# Patient Record
Sex: MALE | Race: BLACK OR AFRICAN AMERICAN | Employment: UNEMPLOYED | URBAN - METROPOLITAN AREA
[De-identification: names, ages, dates, MRNs, and addresses within clinical notes are randomized per-mention and may not be internally consistent; named-entity substitution may affect disease eponyms.]

---

## 2020-10-02 ENCOUNTER — HOSPITAL ENCOUNTER (INPATIENT)
Age: 40
LOS: 2 days | Discharge: HOME | DRG: 432 | End: 2020-10-04
Attending: EMERGENCY MEDICINE | Admitting: INTERNAL MEDICINE

## 2020-10-02 ENCOUNTER — APPOINTMENT (OUTPATIENT)
Dept: GENERAL RADIOLOGY | Age: 40
DRG: 432 | End: 2020-10-02

## 2020-10-02 ENCOUNTER — APPOINTMENT (OUTPATIENT)
Dept: CT IMAGING | Age: 40
DRG: 432 | End: 2020-10-02

## 2020-10-02 PROBLEM — F10.921 ALCOHOL INTOXICATION WITH DELIRIUM (HCC): Status: ACTIVE | Noted: 2020-10-02

## 2020-10-02 PROBLEM — K70.10 ALCOHOLIC HEPATITIS WITHOUT ASCITES: Status: ACTIVE | Noted: 2020-10-02

## 2020-10-02 PROBLEM — R74.01 TRANSAMINITIS: Status: ACTIVE | Noted: 2020-10-02

## 2020-10-02 PROBLEM — K85.20 ALCOHOL-INDUCED ACUTE PANCREATITIS WITHOUT INFECTION OR NECROSIS: Status: ACTIVE | Noted: 2020-10-02

## 2020-10-02 LAB
ABO/RH: NORMAL
ABSOLUTE EOS #: 0.05 K/UL (ref 0–0.44)
ABSOLUTE IMMATURE GRANULOCYTE: 0.07 K/UL (ref 0–0.3)
ABSOLUTE LYMPH #: 2.77 K/UL (ref 1.1–3.7)
ABSOLUTE MONO #: 1.03 K/UL (ref 0.1–1.2)
ACETAMINOPHEN LEVEL: <5 UG/ML (ref 10–30)
ALBUMIN SERPL-MCNC: 3.5 G/DL (ref 3.5–5.2)
ALBUMIN/GLOBULIN RATIO: 0.8 (ref 1–2.5)
ALP BLD-CCNC: 257 U/L (ref 40–129)
ALT SERPL-CCNC: 96 U/L (ref 5–41)
AMMONIA: 66 UMOL/L (ref 16–60)
AMPHETAMINE SCREEN URINE: NEGATIVE
ANION GAP SERPL CALCULATED.3IONS-SCNC: 15 MMOL/L (ref 9–17)
ANTIBODY SCREEN: NEGATIVE
ARM BAND NUMBER: NORMAL
AST SERPL-CCNC: 238 U/L
BARBITURATE SCREEN URINE: NEGATIVE
BASOPHILS # BLD: 2 % (ref 0–2)
BASOPHILS ABSOLUTE: 0.16 K/UL (ref 0–0.2)
BENZODIAZEPINE SCREEN, URINE: NEGATIVE
BILIRUB SERPL-MCNC: 2.53 MG/DL (ref 0.3–1.2)
BILIRUBIN DIRECT: 1.48 MG/DL
BILIRUBIN URINE: NEGATIVE
BUN BLDV-MCNC: 3 MG/DL (ref 6–20)
BUN/CREAT BLD: ABNORMAL (ref 9–20)
BUPRENORPHINE URINE: ABNORMAL
CALCIUM SERPL-MCNC: 8.9 MG/DL (ref 8.6–10.4)
CANNABINOID SCREEN URINE: POSITIVE
CHLORIDE BLD-SCNC: 101 MMOL/L (ref 98–107)
CO2: 22 MMOL/L (ref 20–31)
COCAINE METABOLITE, URINE: NEGATIVE
COLOR: YELLOW
COMMENT UA: NORMAL
CREAT SERPL-MCNC: 0.42 MG/DL (ref 0.7–1.2)
DIFFERENTIAL TYPE: ABNORMAL
EOSINOPHILS RELATIVE PERCENT: 1 % (ref 1–4)
ETHANOL PERCENT: 0.34 %
ETHANOL: 342 MG/DL
EXPIRATION DATE: NORMAL
FOLATE: 9.4 NG/ML
GFR AFRICAN AMERICAN: >60 ML/MIN
GFR NON-AFRICAN AMERICAN: >60 ML/MIN
GFR SERPL CREATININE-BSD FRML MDRD: ABNORMAL ML/MIN/{1.73_M2}
GFR SERPL CREATININE-BSD FRML MDRD: ABNORMAL ML/MIN/{1.73_M2}
GLUCOSE BLD-MCNC: 134 MG/DL (ref 70–99)
GLUCOSE URINE: NEGATIVE
HCT VFR BLD CALC: 35.8 % (ref 40.7–50.3)
HEMOGLOBIN: 11.8 G/DL (ref 13–17)
IMMATURE GRANULOCYTES: 1 %
INR BLD: 1.1
KETONES, URINE: NEGATIVE
LACTIC ACID, WHOLE BLOOD: 2.2 MMOL/L (ref 0.7–2.1)
LACTIC ACID: ABNORMAL MMOL/L
LEUKOCYTE ESTERASE, URINE: NEGATIVE
LIPASE: 64 U/L (ref 13–60)
LYMPHOCYTES # BLD: 29 % (ref 24–43)
MCH RBC QN AUTO: 35.4 PG (ref 25.2–33.5)
MCHC RBC AUTO-ENTMCNC: 33 G/DL (ref 28.4–34.8)
MCV RBC AUTO: 107.5 FL (ref 82.6–102.9)
MDMA URINE: ABNORMAL
METHADONE SCREEN, URINE: NEGATIVE
METHAMPHETAMINE, URINE: ABNORMAL
MONOCYTES # BLD: 11 % (ref 3–12)
NITRITE, URINE: NEGATIVE
NRBC AUTOMATED: 0 PER 100 WBC
OPIATES, URINE: NEGATIVE
OXYCODONE SCREEN URINE: NEGATIVE
PARTIAL THROMBOPLASTIN TIME: 27.7 SEC (ref 20.5–30.5)
PDW BLD-RTO: 14.6 % (ref 11.8–14.4)
PH UA: 5.5 (ref 5–8)
PHENCYCLIDINE, URINE: NEGATIVE
PHOSPHORUS: 2.9 MG/DL (ref 2.5–4.5)
PLATELET # BLD: 206 K/UL (ref 138–453)
PLATELET ESTIMATE: ABNORMAL
PMV BLD AUTO: 10.1 FL (ref 8.1–13.5)
POTASSIUM SERPL-SCNC: 3.7 MMOL/L (ref 3.7–5.3)
PROPOXYPHENE, URINE: ABNORMAL
PROTEIN UA: NEGATIVE
PROTHROMBIN TIME: 11.5 SEC (ref 9–12)
RBC # BLD: 3.33 M/UL (ref 4.21–5.77)
RBC # BLD: ABNORMAL 10*6/UL
SALICYLATE LEVEL: <1 MG/DL (ref 3–10)
SEG NEUTROPHILS: 56 % (ref 36–65)
SEGMENTED NEUTROPHILS ABSOLUTE COUNT: 5.43 K/UL (ref 1.5–8.1)
SODIUM BLD-SCNC: 138 MMOL/L (ref 135–144)
SPECIFIC GRAVITY UA: 1.01 (ref 1–1.03)
TEST INFORMATION: ABNORMAL
TOTAL PROTEIN: 7.8 G/DL (ref 6.4–8.3)
TOXIC TRICYCLIC SC,BLOOD: NEGATIVE
TRICYCLIC ANTIDEPRESSANTS, UR: ABNORMAL
TURBIDITY: CLEAR
URINE HGB: NEGATIVE
UROBILINOGEN, URINE: NORMAL
VITAMIN B-12: 903 PG/ML (ref 232–1245)
WBC # BLD: 9.5 K/UL (ref 3.5–11.3)
WBC # BLD: ABNORMAL 10*3/UL

## 2020-10-02 PROCEDURE — 81003 URINALYSIS AUTO W/O SCOPE: CPT

## 2020-10-02 PROCEDURE — 80053 COMPREHEN METABOLIC PANEL: CPT

## 2020-10-02 PROCEDURE — 82140 ASSAY OF AMMONIA: CPT

## 2020-10-02 PROCEDURE — 2580000003 HC RX 258: Performed by: STUDENT IN AN ORGANIZED HEALTH CARE EDUCATION/TRAINING PROGRAM

## 2020-10-02 PROCEDURE — 83690 ASSAY OF LIPASE: CPT

## 2020-10-02 PROCEDURE — 84100 ASSAY OF PHOSPHORUS: CPT

## 2020-10-02 PROCEDURE — 99285 EMERGENCY DEPT VISIT HI MDM: CPT

## 2020-10-02 PROCEDURE — 82248 BILIRUBIN DIRECT: CPT

## 2020-10-02 PROCEDURE — 85025 COMPLETE CBC W/AUTO DIFF WBC: CPT

## 2020-10-02 PROCEDURE — 99223 1ST HOSP IP/OBS HIGH 75: CPT | Performed by: INTERNAL MEDICINE

## 2020-10-02 PROCEDURE — 86900 BLOOD TYPING SEROLOGIC ABO: CPT

## 2020-10-02 PROCEDURE — 2500000003 HC RX 250 WO HCPCS: Performed by: STUDENT IN AN ORGANIZED HEALTH CARE EDUCATION/TRAINING PROGRAM

## 2020-10-02 PROCEDURE — 71045 X-RAY EXAM CHEST 1 VIEW: CPT

## 2020-10-02 PROCEDURE — G0480 DRUG TEST DEF 1-7 CLASSES: HCPCS

## 2020-10-02 PROCEDURE — 80307 DRUG TEST PRSMV CHEM ANLYZR: CPT

## 2020-10-02 PROCEDURE — 74177 CT ABD & PELVIS W/CONTRAST: CPT

## 2020-10-02 PROCEDURE — 6360000004 HC RX CONTRAST MEDICATION: Performed by: STUDENT IN AN ORGANIZED HEALTH CARE EDUCATION/TRAINING PROGRAM

## 2020-10-02 PROCEDURE — 86850 RBC ANTIBODY SCREEN: CPT

## 2020-10-02 PROCEDURE — 83605 ASSAY OF LACTIC ACID: CPT

## 2020-10-02 PROCEDURE — 85610 PROTHROMBIN TIME: CPT

## 2020-10-02 PROCEDURE — 82746 ASSAY OF FOLIC ACID SERUM: CPT

## 2020-10-02 PROCEDURE — 6360000002 HC RX W HCPCS: Performed by: STUDENT IN AN ORGANIZED HEALTH CARE EDUCATION/TRAINING PROGRAM

## 2020-10-02 PROCEDURE — 36415 COLL VENOUS BLD VENIPUNCTURE: CPT

## 2020-10-02 PROCEDURE — 85730 THROMBOPLASTIN TIME PARTIAL: CPT

## 2020-10-02 PROCEDURE — 2060000000 HC ICU INTERMEDIATE R&B

## 2020-10-02 PROCEDURE — 6360000002 HC RX W HCPCS

## 2020-10-02 PROCEDURE — 86901 BLOOD TYPING SEROLOGIC RH(D): CPT

## 2020-10-02 PROCEDURE — 82607 VITAMIN B-12: CPT

## 2020-10-02 RX ORDER — LORAZEPAM 2 MG/ML
2 INJECTION INTRAMUSCULAR ONCE
Status: DISCONTINUED | OUTPATIENT
Start: 2020-10-02 | End: 2020-10-02

## 2020-10-02 RX ORDER — ACETAMINOPHEN 325 MG/1
650 TABLET ORAL EVERY 4 HOURS PRN
Status: CANCELLED | OUTPATIENT
Start: 2020-10-02

## 2020-10-02 RX ORDER — LORAZEPAM 2 MG/ML
INJECTION INTRAMUSCULAR
Status: DISCONTINUED
Start: 2020-10-02 | End: 2020-10-02

## 2020-10-02 RX ORDER — PROMETHAZINE HYDROCHLORIDE 12.5 MG/1
12.5 TABLET ORAL EVERY 6 HOURS PRN
Status: DISCONTINUED | OUTPATIENT
Start: 2020-10-02 | End: 2020-10-04 | Stop reason: HOSPADM

## 2020-10-02 RX ORDER — THIAMINE HYDROCHLORIDE 100 MG/ML
100 INJECTION, SOLUTION INTRAMUSCULAR; INTRAVENOUS DAILY
Status: DISCONTINUED | OUTPATIENT
Start: 2020-10-02 | End: 2020-10-02

## 2020-10-02 RX ORDER — SODIUM CHLORIDE 9 MG/ML
10 INJECTION INTRAVENOUS DAILY
Status: DISCONTINUED | OUTPATIENT
Start: 2020-10-02 | End: 2020-10-04 | Stop reason: HOSPADM

## 2020-10-02 RX ORDER — LORAZEPAM 2 MG/ML
2 INJECTION INTRAMUSCULAR
Status: DISCONTINUED | OUTPATIENT
Start: 2020-10-02 | End: 2020-10-04 | Stop reason: HOSPADM

## 2020-10-02 RX ORDER — PANTOPRAZOLE SODIUM 40 MG/10ML
40 INJECTION, POWDER, LYOPHILIZED, FOR SOLUTION INTRAVENOUS DAILY
Status: DISCONTINUED | OUTPATIENT
Start: 2020-10-02 | End: 2020-10-04 | Stop reason: HOSPADM

## 2020-10-02 RX ORDER — SODIUM CHLORIDE 0.9 % (FLUSH) 0.9 %
10 SYRINGE (ML) INJECTION EVERY 12 HOURS SCHEDULED
Status: DISCONTINUED | OUTPATIENT
Start: 2020-10-02 | End: 2020-10-04 | Stop reason: HOSPADM

## 2020-10-02 RX ORDER — LORAZEPAM 2 MG/ML
4 INJECTION INTRAMUSCULAR
Status: DISCONTINUED | OUTPATIENT
Start: 2020-10-02 | End: 2020-10-04 | Stop reason: HOSPADM

## 2020-10-02 RX ORDER — LORAZEPAM 0.5 MG/1
1 TABLET ORAL
Status: DISCONTINUED | OUTPATIENT
Start: 2020-10-02 | End: 2020-10-04 | Stop reason: HOSPADM

## 2020-10-02 RX ORDER — LORAZEPAM 2 MG/1
4 TABLET ORAL
Status: DISCONTINUED | OUTPATIENT
Start: 2020-10-02 | End: 2020-10-04 | Stop reason: HOSPADM

## 2020-10-02 RX ORDER — SODIUM CHLORIDE, SODIUM LACTATE, POTASSIUM CHLORIDE, AND CALCIUM CHLORIDE .6; .31; .03; .02 G/100ML; G/100ML; G/100ML; G/100ML
1000 INJECTION, SOLUTION INTRAVENOUS ONCE
Status: COMPLETED | OUTPATIENT
Start: 2020-10-02 | End: 2020-10-02

## 2020-10-02 RX ORDER — SODIUM CHLORIDE 0.9 % (FLUSH) 0.9 %
10 SYRINGE (ML) INJECTION EVERY 12 HOURS SCHEDULED
Status: CANCELLED | OUTPATIENT
Start: 2020-10-02

## 2020-10-02 RX ORDER — FOLIC ACID 5 MG/ML
1 INJECTION, SOLUTION INTRAMUSCULAR; INTRAVENOUS; SUBCUTANEOUS DAILY
Status: DISCONTINUED | OUTPATIENT
Start: 2020-10-02 | End: 2020-10-02

## 2020-10-02 RX ORDER — POLYETHYLENE GLYCOL 3350 17 G/17G
17 POWDER, FOR SOLUTION ORAL DAILY PRN
Status: DISCONTINUED | OUTPATIENT
Start: 2020-10-02 | End: 2020-10-04 | Stop reason: HOSPADM

## 2020-10-02 RX ORDER — HALOPERIDOL 5 MG/ML
INJECTION INTRAMUSCULAR
Status: DISCONTINUED
Start: 2020-10-02 | End: 2020-10-02

## 2020-10-02 RX ORDER — ONDANSETRON 2 MG/ML
4 INJECTION INTRAMUSCULAR; INTRAVENOUS EVERY 6 HOURS PRN
Status: DISCONTINUED | OUTPATIENT
Start: 2020-10-02 | End: 2020-10-04 | Stop reason: HOSPADM

## 2020-10-02 RX ORDER — SODIUM CHLORIDE 0.9 % (FLUSH) 0.9 %
10 SYRINGE (ML) INJECTION PRN
Status: DISCONTINUED | OUTPATIENT
Start: 2020-10-02 | End: 2020-10-04 | Stop reason: HOSPADM

## 2020-10-02 RX ORDER — LORAZEPAM 2 MG/ML
1 INJECTION INTRAMUSCULAR
Status: DISCONTINUED | OUTPATIENT
Start: 2020-10-02 | End: 2020-10-04 | Stop reason: HOSPADM

## 2020-10-02 RX ORDER — LORAZEPAM 2 MG/ML
3 INJECTION INTRAMUSCULAR
Status: DISCONTINUED | OUTPATIENT
Start: 2020-10-02 | End: 2020-10-04 | Stop reason: HOSPADM

## 2020-10-02 RX ORDER — SODIUM CHLORIDE, SODIUM LACTATE, POTASSIUM CHLORIDE, AND CALCIUM CHLORIDE .6; .31; .03; .02 G/100ML; G/100ML; G/100ML; G/100ML
1000 INJECTION, SOLUTION INTRAVENOUS ONCE
Status: DISCONTINUED | OUTPATIENT
Start: 2020-10-02 | End: 2020-10-02

## 2020-10-02 RX ORDER — HALOPERIDOL 5 MG/ML
10 INJECTION INTRAMUSCULAR ONCE
Status: DISCONTINUED | OUTPATIENT
Start: 2020-10-02 | End: 2020-10-02

## 2020-10-02 RX ORDER — HALOPERIDOL 5 MG/ML
INJECTION INTRAMUSCULAR
Status: COMPLETED
Start: 2020-10-02 | End: 2020-10-02

## 2020-10-02 RX ORDER — LORAZEPAM 0.5 MG/1
2 TABLET ORAL
Status: DISCONTINUED | OUTPATIENT
Start: 2020-10-02 | End: 2020-10-04 | Stop reason: HOSPADM

## 2020-10-02 RX ORDER — SODIUM CHLORIDE 9 MG/ML
INJECTION, SOLUTION INTRAVENOUS CONTINUOUS
Status: DISCONTINUED | OUTPATIENT
Start: 2020-10-02 | End: 2020-10-03

## 2020-10-02 RX ORDER — SODIUM CHLORIDE 0.9 % (FLUSH) 0.9 %
10 SYRINGE (ML) INJECTION PRN
Status: CANCELLED | OUTPATIENT
Start: 2020-10-02

## 2020-10-02 RX ORDER — M-VIT,TX,IRON,MINS/CALC/FOLIC 27MG-0.4MG
1 TABLET ORAL DAILY
Status: DISCONTINUED | OUTPATIENT
Start: 2020-10-02 | End: 2020-10-04 | Stop reason: HOSPADM

## 2020-10-02 RX ORDER — SODIUM CHLORIDE, SODIUM LACTATE, POTASSIUM CHLORIDE, AND CALCIUM CHLORIDE .6; .31; .03; .02 G/100ML; G/100ML; G/100ML; G/100ML
1000 INJECTION, SOLUTION INTRAVENOUS ONCE
Status: COMPLETED | OUTPATIENT
Start: 2020-10-02 | End: 2020-10-03

## 2020-10-02 RX ADMIN — SODIUM CHLORIDE, POTASSIUM CHLORIDE, SODIUM LACTATE AND CALCIUM CHLORIDE 1000 ML: 600; 310; 30; 20 INJECTION, SOLUTION INTRAVENOUS at 22:20

## 2020-10-02 RX ADMIN — HALOPERIDOL 10 MG: 5 INJECTION INTRAMUSCULAR at 11:59

## 2020-10-02 RX ADMIN — IOPAMIDOL 75 ML: 755 INJECTION, SOLUTION INTRAVENOUS at 17:39

## 2020-10-02 RX ADMIN — Medication 10 ML: at 22:20

## 2020-10-02 RX ADMIN — HALOPERIDOL LACTATE 10 MG: 5 INJECTION, SOLUTION INTRAMUSCULAR at 11:59

## 2020-10-02 RX ADMIN — SODIUM CHLORIDE, POTASSIUM CHLORIDE, SODIUM LACTATE AND CALCIUM CHLORIDE 1000 ML: 600; 310; 30; 20 INJECTION, SOLUTION INTRAVENOUS at 12:30

## 2020-10-02 RX ADMIN — FOLIC ACID: 5 INJECTION, SOLUTION INTRAMUSCULAR; INTRAVENOUS; SUBCUTANEOUS at 15:00

## 2020-10-02 SDOH — HEALTH STABILITY: MENTAL HEALTH: HOW OFTEN DO YOU HAVE A DRINK CONTAINING ALCOHOL?: 4 OR MORE TIMES A WEEK

## 2020-10-02 SDOH — HEALTH STABILITY: MENTAL HEALTH: HOW MANY STANDARD DRINKS CONTAINING ALCOHOL DO YOU HAVE ON A TYPICAL DAY?: 3 OR 4

## 2020-10-02 ASSESSMENT — ENCOUNTER SYMPTOMS: BLOOD IN STOOL: 1

## 2020-10-02 NOTE — ED PROVIDER NOTES
Hemoglobin 11.8 (*)     Hematocrit 35.8 (*)     .5 (*)     MCH 35.4 (*)     RDW 14.6 (*)     Immature Granulocytes 1 (*)     All other components within normal limits   COMPREHENSIVE METABOLIC PANEL - Abnormal; Notable for the following components:    Glucose 134 (*)     BUN 3 (*)     CREATININE 0.42 (*)     Alkaline Phosphatase 257 (*)     ALT 96 (*)      (*)     Total Bilirubin 2.53 (*)     Albumin/Globulin Ratio 0.8 (*)     All other components within normal limits   TOX SCR, BLD, ED - Abnormal; Notable for the following components:    Acetaminophen Level <5 (*)     Ethanol 342 (*)     Ethanol percent 7.048 (*)     Salicylate Lvl <1 (*)     All other components within normal limits   LIPASE - Abnormal; Notable for the following components:    Lipase 64 (*)     All other components within normal limits   AMMONIA - Abnormal; Notable for the following components:    Ammonia 66 (*)     All other components within normal limits   BILIRUBIN, DIRECT - Abnormal; Notable for the following components:    Bilirubin, Direct 1.48 (*)     All other components within normal limits   PROTIME-INR   APTT   URINE RT REFLEX TO CULTURE   LACTIC ACID, PLASMA   PHOSPHORUS   URINE DRUG SCREEN   TYPE AND SCREEN       Ct Abdomen Pelvis W Iv Contrast Additional Contrast? None    Result Date: 10/2/2020  EXAMINATION: CT OF THE ABDOMEN AND PELVIS WITH CONTRAST 10/2/2020 3:34 pm TECHNIQUE: CT of the abdomen and pelvis was performed with the administration of intravenous contrast. Multiplanar reformatted images are provided for review. Dose modulation, iterative reconstruction, and/or weight based adjustment of the mA/kV was utilized to reduce the radiation dose to as low as reasonably achievable. COMPARISON: None.  HISTORY: ORDERING SYSTEM PROVIDED HISTORY: Alcohol intoxication, possible pancreatitis, ascitic fluid, possible hernia TECHNOLOGIST PROVIDED HISTORY: Alcohol intoxication, possible pancreatitis, ascitic fluid, possible hernia Reason for Exam: ETOH, possible pancreatitis, ascitic fluid, possible hernia Acuity: Acute Type of Exam: Initial FINDINGS: Lower Chest: Basilar atelectasis is noted. No effusion, consolidation or cardiomegaly is noted. Organs: Hepatomegaly and hepatic steatosis are noted without focal mass lesion. Spleen is unremarkable. Pericholecystic fluid is noted. No distinct gallstones are noted in the gallbladder. Acalculous cholecystitis is not excluded. Stranding around the tail of the pancreas is noted with some haziness around the head of the pancreas. Findings favor pancreatitis. No pseudocyst is visualized. Adrenals and kidneys demonstrate no acute abnormality although a left renal cyst of 1.4 cm is present. GI/Bowel: No free fluid, free air, bowel obstruction or bowel wall thickening is noted. Pelvis: Bladder is unremarkable. Shotty inguinal lymph nodes are present. Prostate is enlarged. No abnormal fluid collections, pelvic or inguinal adenopathy is noted. No appendicitis is noted. Peritoneum/Retroperitoneum: No aortic aneurysm, retroperitoneal or mesenteric adenopathy is noted. Bones/Soft Tissues: No acute osseous or soft tissue abnormality is noted. 1.  Haziness around the pancreas consistent with pancreatitis. 2.  Fluid around the gallbladder. Findings may be secondary to pancreatitis with acalculous cholecystitis not excluded. 3.  Hepatomegaly with fatty infiltration of the liver. Xr Chest Portable    Result Date: 10/2/2020  EXAMINATION: ONE XRAY VIEW OF THE CHEST 10/2/2020 2:53 pm COMPARISON: None. HISTORY: ORDERING SYSTEM PROVIDED HISTORY: alcohol intoxication TECHNOLOGIST PROVIDED HISTORY: alcohol intoxication FINDINGS: AP portable view of the chest time stamped at 1446 hours demonstrates overlying cardiac monitoring electrodes. Heart size is normal.  No vascular congestion, focal consolidation, effusion, or pneumothorax is noted.   Osseous and mediastinal structures are

## 2020-10-02 NOTE — ED NOTES
ED Violent/Self-Destructive Restraints Restraint Monitoring Q15 Minutes - Psychological Status: Subdued  Physical Comfort:  (Resting, no distress noted. ) Circulation: No signs of injury  Continuous Observation: Yes   Restraint Type - Leather/Alternative B Wrist (V): CONTINUED  Leather/Alternative B Ankle (V): CONTINUED           Kassie Hathaway RN  10/02/20 5615

## 2020-10-02 NOTE — ED PROVIDER NOTES
Faculty Sign-Out Attestation  Handoff taken on the following patient from prior Attending Physician: Nadia Ruiz    I was available and discussed any additional care issues that arose and coordinated the management plans with the resident(s) caring for the patient during my duty period. Any areas of disagreement with residents documentation of care or procedures are noted on the chart. I was personally present for the key portions of any/all procedures during my duty period. I have documented in the chart those procedures where I was not present during the key portions. Intoxicated but several lab abnormalities, to be admitted. CT ABDOMEN PELVIS W IV CONTRAST Additional Contrast? None   Preliminary Result   1. Haziness around the pancreas consistent with pancreatitis. 2.  Fluid around the gallbladder. Findings may be secondary to pancreatitis   with acalculous cholecystitis not excluded. 3.  Hepatomegaly with fatty infiltration of the liver. XR CHEST PORTABLE   Final Result   No acute cardiopulmonary process.                Shannon Limon MD  Attending Physician        Shannon Limon MD  10/02/20 2739

## 2020-10-02 NOTE — ED NOTES
ED Violent/Self-Destructive Restraints Restraint Monitoring Q15 Minutes - Psychological Status: Subdued  Physical Comfort:  (Resting, no distress noted. ) Circulation: No signs of injury  Continuous Observation: Yes   Restraint Type - Leather/Alternative B Wrist (V): CONTINUED  Leather/Alternative B Ankle (V): CONTINUED           Tennie SUZAN Collazo  10/02/20 6192

## 2020-10-02 NOTE — ED NOTES
Pt. Resting on stretcher, eyes closed, RR even and non-labored, no distress noted.    Will continue to monitor         Radha Arreguin RN  10/02/20 0310
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
## 2020-10-02 NOTE — ED NOTES
ED Violent/Self-Destructive Restraints Restraint Monitoring Q15 Minutes - Psychological Status: Subdued  Physical Comfort:  (Resting, no distress noted. ) Circulation: No signs of injury  Continuous Observation: Yes   Restraint Type - Leather/Alternative B Wrist (V): CONTINUED  Leather/Alternative B Ankle (V): CONTINUED           Kelby Hollis RN  10/02/20 6588
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
## 2020-10-02 NOTE — ED NOTES
Pt pushed door open and escaped the [facility]. Security called and walked pt back to the [facility]. Pt dinner tray delivered. Pt now sitting down, eating. [facility]guard at bedside. Safe environment maintained. Will continue to monitor.       Andria Snyder RN  10/02/20 6193

## 2020-10-02 NOTE — ED NOTES
ED Violent/Self-Destructive Restraints Restraint Monitoring Q15 Minutes - Psychological Status: Subdued  Physical Comfort:  (Resting, no distress noted. ) Circulation: No signs of injury  Continuous Observation: Yes   Restraint Type - Leather/Alternative B Wrist (V): CONTINUED  Leather/Alternative B Ankle (V): CONTINUED           Shakira Kamara RN  10/02/20 4245

## 2020-10-03 LAB
ABSOLUTE EOS #: 0.06 K/UL (ref 0–0.44)
ABSOLUTE IMMATURE GRANULOCYTE: 0.03 K/UL (ref 0–0.3)
ABSOLUTE LYMPH #: 1.62 K/UL (ref 1.1–3.7)
ABSOLUTE MONO #: 0.48 K/UL (ref 0.1–1.2)
ALBUMIN SERPL-MCNC: 2.5 G/DL (ref 3.5–5.2)
ALBUMIN/GLOBULIN RATIO: 0.8 (ref 1–2.5)
ALP BLD-CCNC: 168 U/L (ref 40–129)
ALT SERPL-CCNC: 66 U/L (ref 5–41)
ANION GAP SERPL CALCULATED.3IONS-SCNC: 6 MMOL/L (ref 9–17)
AST SERPL-CCNC: 166 U/L
BASOPHILS # BLD: 1 % (ref 0–2)
BASOPHILS ABSOLUTE: 0.07 K/UL (ref 0–0.2)
BILIRUB SERPL-MCNC: 1.93 MG/DL (ref 0.3–1.2)
BUN BLDV-MCNC: 4 MG/DL (ref 6–20)
BUN/CREAT BLD: ABNORMAL (ref 9–20)
CALCIUM SERPL-MCNC: 8.3 MG/DL (ref 8.6–10.4)
CHLORIDE BLD-SCNC: 108 MMOL/L (ref 98–107)
CO2: 27 MMOL/L (ref 20–31)
CREAT SERPL-MCNC: 0.42 MG/DL (ref 0.7–1.2)
DIFFERENTIAL TYPE: ABNORMAL
EOSINOPHILS RELATIVE PERCENT: 1 % (ref 1–4)
GFR AFRICAN AMERICAN: >60 ML/MIN
GFR NON-AFRICAN AMERICAN: >60 ML/MIN
GFR SERPL CREATININE-BSD FRML MDRD: ABNORMAL ML/MIN/{1.73_M2}
GFR SERPL CREATININE-BSD FRML MDRD: ABNORMAL ML/MIN/{1.73_M2}
GLUCOSE BLD-MCNC: 84 MG/DL (ref 70–99)
HCT VFR BLD CALC: 30.2 % (ref 40.7–50.3)
HEMOGLOBIN: 9.6 G/DL (ref 13–17)
IMMATURE GRANULOCYTES: 1 %
LIPASE: 36 U/L (ref 13–60)
LYMPHOCYTES # BLD: 26 % (ref 24–43)
MCH RBC QN AUTO: 34.5 PG (ref 25.2–33.5)
MCHC RBC AUTO-ENTMCNC: 31.8 G/DL (ref 28.4–34.8)
MCV RBC AUTO: 108.6 FL (ref 82.6–102.9)
MONOCYTES # BLD: 8 % (ref 3–12)
NRBC AUTOMATED: 0 PER 100 WBC
PDW BLD-RTO: 14.6 % (ref 11.8–14.4)
PLATELET # BLD: 160 K/UL (ref 138–453)
PLATELET ESTIMATE: ABNORMAL
PMV BLD AUTO: 10.1 FL (ref 8.1–13.5)
POTASSIUM SERPL-SCNC: 3.7 MMOL/L (ref 3.7–5.3)
RBC # BLD: 2.78 M/UL (ref 4.21–5.77)
RBC # BLD: ABNORMAL 10*6/UL
SEG NEUTROPHILS: 64 % (ref 36–65)
SEGMENTED NEUTROPHILS ABSOLUTE COUNT: 4.02 K/UL (ref 1.5–8.1)
SODIUM BLD-SCNC: 141 MMOL/L (ref 135–144)
TOTAL PROTEIN: 5.6 G/DL (ref 6.4–8.3)
WBC # BLD: 6.3 K/UL (ref 3.5–11.3)
WBC # BLD: ABNORMAL 10*3/UL

## 2020-10-03 PROCEDURE — C9113 INJ PANTOPRAZOLE SODIUM, VIA: HCPCS | Performed by: STUDENT IN AN ORGANIZED HEALTH CARE EDUCATION/TRAINING PROGRAM

## 2020-10-03 PROCEDURE — 6360000002 HC RX W HCPCS: Performed by: STUDENT IN AN ORGANIZED HEALTH CARE EDUCATION/TRAINING PROGRAM

## 2020-10-03 PROCEDURE — 36415 COLL VENOUS BLD VENIPUNCTURE: CPT

## 2020-10-03 PROCEDURE — 85025 COMPLETE CBC W/AUTO DIFF WBC: CPT

## 2020-10-03 PROCEDURE — 2580000003 HC RX 258: Performed by: STUDENT IN AN ORGANIZED HEALTH CARE EDUCATION/TRAINING PROGRAM

## 2020-10-03 PROCEDURE — 99232 SBSQ HOSP IP/OBS MODERATE 35: CPT | Performed by: INTERNAL MEDICINE

## 2020-10-03 PROCEDURE — 83690 ASSAY OF LIPASE: CPT

## 2020-10-03 PROCEDURE — 6370000000 HC RX 637 (ALT 250 FOR IP): Performed by: STUDENT IN AN ORGANIZED HEALTH CARE EDUCATION/TRAINING PROGRAM

## 2020-10-03 PROCEDURE — 2060000000 HC ICU INTERMEDIATE R&B

## 2020-10-03 PROCEDURE — 80053 COMPREHEN METABOLIC PANEL: CPT

## 2020-10-03 PROCEDURE — 2500000003 HC RX 250 WO HCPCS: Performed by: STUDENT IN AN ORGANIZED HEALTH CARE EDUCATION/TRAINING PROGRAM

## 2020-10-03 RX ORDER — FOLIC ACID 1 MG/1
1 TABLET ORAL DAILY
Status: DISCONTINUED | OUTPATIENT
Start: 2020-10-03 | End: 2020-10-04 | Stop reason: HOSPADM

## 2020-10-03 RX ORDER — THIAMINE MONONITRATE (VIT B1) 100 MG
100 TABLET ORAL DAILY
Status: DISCONTINUED | OUTPATIENT
Start: 2020-10-03 | End: 2020-10-04 | Stop reason: HOSPADM

## 2020-10-03 RX ORDER — SODIUM CHLORIDE, SODIUM LACTATE, POTASSIUM CHLORIDE, AND CALCIUM CHLORIDE .6; .31; .03; .02 G/100ML; G/100ML; G/100ML; G/100ML
1000 INJECTION, SOLUTION INTRAVENOUS ONCE
Status: COMPLETED | OUTPATIENT
Start: 2020-10-03 | End: 2020-10-03

## 2020-10-03 RX ORDER — 0.9 % SODIUM CHLORIDE 0.9 %
1000 INTRAVENOUS SOLUTION INTRAVENOUS ONCE
Status: COMPLETED | OUTPATIENT
Start: 2020-10-03 | End: 2020-10-03

## 2020-10-03 RX ADMIN — PANTOPRAZOLE SODIUM 40 MG: 40 INJECTION, POWDER, FOR SOLUTION INTRAVENOUS at 11:20

## 2020-10-03 RX ADMIN — Medication 10 ML: at 11:21

## 2020-10-03 RX ADMIN — SODIUM CHLORIDE 10 ML: 9 INJECTION, SOLUTION INTRAMUSCULAR; INTRAVENOUS; SUBCUTANEOUS at 11:20

## 2020-10-03 RX ADMIN — MULTIPLE VITAMINS W/ MINERALS TAB 1 TABLET: TAB at 11:20

## 2020-10-03 RX ADMIN — FOLIC ACID: 5 INJECTION, SOLUTION INTRAMUSCULAR; INTRAVENOUS; SUBCUTANEOUS at 11:14

## 2020-10-03 RX ADMIN — SODIUM CHLORIDE 1000 ML: 9 INJECTION, SOLUTION INTRAVENOUS at 00:22

## 2020-10-03 RX ADMIN — SODIUM CHLORIDE, POTASSIUM CHLORIDE, SODIUM LACTATE AND CALCIUM CHLORIDE 1000 ML: 600; 310; 30; 20 INJECTION, SOLUTION INTRAVENOUS at 09:28

## 2020-10-03 ASSESSMENT — ENCOUNTER SYMPTOMS: TACHYPNEA: 1

## 2020-10-03 NOTE — FLOWSHEET NOTE
Assessment:  Patient was reluctant to meet with . Patient did not want to talk. Intervention;  was ministry of presence. Outcome:  Patient wanted to end visit. Plan:  Chaplains will remain available for spiritual and emotional support as needed.      10/03/20 1704   Encounter Summary   Services provided to: Patient   Referral/Consult From: [facility]   Continue Visiting   (10/3/2020)   Complexity of Encounter Moderate   Length of Encounter 15 minutes   Spiritual Assessment Completed Yes   Routine   Type Initial   Assessment Approachable;Passive   Intervention Active listening;Sustaining presence/ Ministry of presence   Outcome Expressed gratitude

## 2020-10-03 NOTE — ED NOTES
Pt resting on cot. Safeguard at bedside. Safe environment maintained. Will continue to monitor.       Saqib Owusu RN  10/02/20 4043

## 2020-10-03 NOTE — PROGRESS NOTES
[facility]  Internal Medicine Teaching Residency Program  Inpatient Daily Progress Note  ______________________________________________________________________________    Patient: Bill Rodgers  YOB: 1980   IJY:[id number]    Acct: [de-identified]     Room: [facility]7789-64  Admit date: 10/2/2020  Today's date: 10/03/20  Number of days in the hospital: 1    SUBJECTIVE   Admitting Diagnosis: Alcohol-induced acute pancreatitis without infection or necrosis  CC: Alcohol intoxication. Pt examined at bedside. Chart & results reviewed. No acute overnight events. Vitals stable. Patient remains drowsy although arousable. No complaint of abdominal pain. Will start general diet, discontinue fluids, give PO thiamine, folic acid, multivitamins, monitor for delirium tremens, alcoholic hallucinosis as it has been 24 hours to last drink. ROS:  Constitutional:  negative for chills, fevers, sweats  Respiratory:  negative for cough, dyspnea on exertion, hemoptysis, shortness of breath, wheezing  Cardiovascular:  negative for chest pain, chest pressure/discomfort, lower extremity edema, palpitations  Gastrointestinal:  negative for abdominal pain, constipation, diarrhea, nausea, vomiting  Neurological:  negative for dizziness, headache  BRIEF HISTORY     The patient is a pleasant 37 y.o. male presents to the ER department intoxicated, brought by his father. According to the father, the patient is a daily drinker consuming 10-15 12 beers per day since age of 10. He recently moved to [facility] and has been having rectal incontinence, urinary incontinence, blood in stools, groin swelling.     Last drink was this morning. Upon arrival in the ER, patient was agitated and combative, requiring 2 doses of 5 mg Haldol IV and 2 mg Ativan IV. CT abdomen pelvis with IV contrast showed stranding around the tail and haziness around the head of the pancreas, likely pancreatitis. Pericholecystic fluid around the gallbladder (but no gallstones) likely secondary to acalculous cholecystitis. There is hepatomegaly with fatty infiltration of the liver and enlarged prostate. Chest x-ray showed no acute cardiopulmonary process.     On exam, patient was sedated due to Haldol and Ativan. Physical exam showed distended abdomen but no fluid thrill, with subdiaphragmatic abdominal protrusion.     In the ER: Vitals stable, afebrile, SPO2 95 on room air. AST> ALT: 238> 96                   Bilirubin 2.53, direct bilirubin 1.48,                    Lipase 64>>                  Ammonia 66                   Serum ethanol 0.342                  Urine drug screen positive for cannabinoid. UA negative for hemoglobin, ketones, bilirubin, nitrites, leukocyte                        esterases.     ER management: Haldol 5 mg x 2 doses. CIWA protocol started. 0.9% NS infusion at 100 mL/h                                Folic acid 1 mg, thiamine 100 mg, adult multivitamin with                                    vitamin K 10 mL in D5    OBJECTIVE     Vital Signs:  /72   Pulse 75   Temp 98.9 °F (37.2 °C) (Oral)   Resp 17   Ht 5' 11\" (1.803 m)   Wt 146 lb (66.2 kg)   SpO2 98%   BMI 20.36 kg/m²     Temp (24hrs), Av.5 °F (36.9 °C), Min:97.5 °F (36.4 °C), Max:99.9 °F (37.7 °C)    In: 1779   Out: 300 [Urine:300]    Physical Exam:  Constitutional: This is a well developed, well nourished, 18.5-24.9 - Normal 37y.o. year old male who is alert, oriented, cooperative and in no apparent distress. Head:normocephalic and atraumatic. EENT:  PERRLA. No conjunctival injections. Septum was midline, mucosa was without erythema, exudates or cobblestoning. No thrush was noted. Neck: Supple without thyromegaly. No elevated JVP. Trachea was midline.   Respiratory: Chest was symmetrical without dullness to percussion. Breath sounds bilaterally were clear to auscultation. There were no wheezes, rhonchi or rales. There is no intercostal retraction or use of accessory muscles. No egophony noted. Cardiovascular: Regular without murmur, clicks, gallops or rubs. Abdomen: Slightly rounded and soft without organomegaly. No rebound, rigidity or guarding was appreciated. Lymphatic: No lymphadenopathy. Musculoskeletal: Normal curvature of the spine. No gross muscle weakness. Extremities:  No lower extremity edema, ulcerations, tenderness, varicosities or erythema. Muscle size, tone and strength are normal.  No involuntary movements are noted. Skin:  Warm and dry. Good color, turgor and pigmentation. No lesions or scars.   No cyanosis or clubbing  Neurological/Psychiatric: The patient's general behavior, level of consciousness, thought content and emotional status is normal.        Medications:  Scheduled Medications:    folic acid  1 mg Oral Daily    thiamine  100 mg Oral Daily    sodium chloride flush  10 mL Intravenous 2 times per day    pantoprazole  40 mg Intravenous Daily    And    sodium chloride (PF)  10 mL Intravenous Daily    therapeutic multivitamin-minerals  1 tablet Oral Daily     Continuous Infusions:   PRN Medicationssodium chloride flush, 10 mL, PRN  polyethylene glycol, 17 g, Daily PRN  promethazine, 12.5 mg, Q6H PRN    Or  ondansetron, 4 mg, Q6H PRN  LORazepam, 1 mg, Q1H PRN    Or  LORazepam, 1 mg, Q1H PRN    Or  LORazepam, 2 mg, Q1H PRN    Or  LORazepam, 2 mg, Q1H PRN    Or  LORazepam, 3 mg, Q1H PRN    Or  LORazepam, 3 mg, Q1H PRN    Or  LORazepam, 4 mg, Q1H PRN    Or  LORazepam, 4 mg, Q1H PRN        Diagnostic Labs:  CBC:   Recent Labs     10/02/20  1227 10/03/20  0523   WBC 9.5 6.3   RBC 3.33* 2.78*   HGB 11.8* 9.6*   HCT 35.8* 30.2*   .5* 108.6*   RDW 14.6* 14.6*    160     BMP:   Recent Labs     10/02/20  1227 10/02/20  2301 10/03/20  0523     --  141   K 3.7  -- 3.7     --  108*   CO2 22  --  27   PHOS  --  2.9  --    BUN 3*  --  4*   CREATININE 0.42*  --  0.42*     BNP: No results for input(s): BNP in the last 72 hours. PT/INR:   Recent Labs     10/02/20  1227   PROTIME 11.5   INR 1.1     APTT:   Recent Labs     10/02/20  1227   APTT 27.7     CARDIAC ENZYMES: No results for input(s): CKMB, CKMBINDEX, TROPONINI in the last 72 hours. Invalid input(s): CKTOTAL;3  FASTING LIPID PANEL:No results found for: CHOL, HDL, TRIG  LIVER PROFILE:   Recent Labs     10/02/20  1227 10/03/20  0523   * 166*   ALT 96* 66*   BILIDIR 1.48*  --    BILITOT 2.53* 1.93*   ALKPHOS 257* 168*      MICROBIOLOGY: No results found for: CULTURE    Imaging:    Ct Abdomen Pelvis W Iv Contrast Additional Contrast? None    Result Date: 10/2/2020  1. Haziness around the pancreas consistent with pancreatitis. 2.  Fluid around the gallbladder. Findings may be secondary to pancreatitis with acalculous cholecystitis not excluded. 3.  Hepatomegaly with fatty infiltration of the liver. Xr Chest Portable    Result Date: 10/2/2020  No acute cardiopulmonary process. ASSESSMENT & PLAN     ASSESSMENT / PLAN:     Active Problems:  Alcoholic hepatitis without ascites  -Monitor LFTs daily. -LFTs improving: ALT 96>>66, >>166, bilirubin 2.53>>1.93  -Madrey discriminant factor score for alcoholic hepatitis 4.0   -CT abdomen showed hepatomegaly, with fatty infiltration of liver but no ascites fluid present.  -Discontinue Tylenol due to increased LFTs.     Alcohol induced acute pancreatitis without infection or necrosis  -Lipase decreased from 64 to 36. -CT abdomen pelvis showed stranding around the tail and haziness around the head of the pancreas.  -No gallstones seen but pericholecystic fluid noted around the gallbladder.  -Diet general started today.  -Zofran 4 mg IV for nausea. -Lactic acid blood 2.2, secondary to alcohol induced metabolic acidosis.   -All fluids discontinued.       Alcohol withdrawal secondary to intoxication:  -Patient placed on CIWA protocol.  -Folate, B12 wnl.  -Multivitamin, thiamine 750 mg, folic acid 1 mg replaced.  -will monitor for delirium tremens, alcoholic hallucinosis as it has been 24 hours since last drink.           DVT ppx: None  GI ppx: Protonix IV 40 mg daily.     PT/OT/SW: Ordered  Discharge Planning: To be covered by case management. Portia Cheung MD  Internal Medicine Resident, PGY-1  bessie Estrada; [place], [place]  10/3/2020, 12:07 PM  Attending Physician Statement  I have discussed the care of Violet Rodriguez and I have examined the patient myselft and taken ros and hpi , including pertinent history and exam findings,  with the resident. I have reviewed the key elements of all parts of the encounter with the resident. I agree with the assessment, plan and orders as documented by the resident.       Electronically signed by Gerhardt Lovings, MD

## 2020-10-03 NOTE — CARE COORDINATION
Met with pt after receiving a social work consult for [employer] abuse\". Pt was alert and oriented but did not want to discuss his drinking. When asked how much he is drinking he stated \"I don't know, I don't keep track\". Attempted to discuss if he felt he needed help with his drinking. He stated \"no, I can take care of it myself\". Asked pt to call for SW if she changes his mind.

## 2020-10-03 NOTE — ED NOTES
Security unlocked pt belongings. Medic transporting pt to floor via wheelchair.       Kay Owens RN  10/02/20 0833

## 2020-10-03 NOTE — PROGRESS NOTES
enzymes. · We will start patient on Protonix 40 mg IV. · We will give patient 1 L of Ringer lactate bolus followed by normal saline infusion at 100. · 1 L of banana bag given. Will give patient IV multivitamins. · We will check lactic acid one time. Lipase check tomorrow. Will check vitamin B12 and folate. We will continue to trend LFTs.     La Dan MD  PGY-3, Department of Internal Medicine  [place], [place]  10/2/2020 10:54 PM

## 2020-10-03 NOTE — H&P
management: Haldol 5 mg x 2 doses. CIWA protocol started. 0.9% NS infusion at 100 mL/h                                Folic acid 1 mg, thiamine 100 mg, adult multivitamin with                                    vitamin K 10 mL in D5    Review of Systems:  ROS could not be completed as patient is sedated. PAST MEDICAL HISTORY     No past medical history on file. PAST SURGICAL HISTORY     No past surgical history on file. ALLERGIES     Patient has no allergy information on record. MEDICATIONS PRIOR TO ADMISSION     Prior to Admission medications    Not on File       SOCIAL HISTORY     Tobacco: Unknown  Alcohol: Unknown  Illicits: Cannabinoid  Occupation: Unknown    FAMILY HISTORY     No family history on file. PHYSICAL EXAM     Vitals: /86   Pulse 84   Temp 97.5 °F (36.4 °C) (Oral)   Resp 17   Ht 5' 11\" (1.803 m)   Wt 146 lb (66.2 kg)   SpO2 96%   BMI 20.36 kg/m²   Tmax: Temp (24hrs), Av.5 °F (36.4 °C), Min:97.5 °F (36.4 °C), Max:97.5 °F (36.4 °C)    Last Body weight:   Wt Readings from Last 3 Encounters:   10/02/20 146 lb (66.2 kg)     Body Mass Index : Body mass index is 20.36 kg/m². PHYSICAL EXAMINATION:  Constitutional: This is a well developed, well nourished, 18.5-24.9 - Normal 37y.o. year old male who is sedated, sleeping but in no apparent distress. Head:normocephalic and atraumatic. EENT:  PERRLA. No conjunctival injections. Septum was midline, mucosa was without erythema, exudates or cobblestoning. No thrush was noted. Neck: Supple without thyromegaly. No elevated JVP. Trachea was midline. Respiratory: Chest was symmetrical without dullness to percussion. Breath sounds bilaterally were clear to auscultation. There were no wheezes, rhonchi or rales. There is no intercostal retraction or use of accessory muscles. No egophony noted. Cardiovascular: Regular without murmur, clicks, gallops or rubs. Abdomen: Distended, firm to palpation. Lymphatic: No lymphadenopathy. Musculoskeletal: Normal curvature of the spine. No gross muscle weakness. Extremities:  No lower extremity edema, ulcerations, tenderness, varicosities or erythema. Muscle size, tone and strength are normal.  No involuntary movements are noted. Skin:  Warm and dry. Good color, turgor and pigmentation. No lesions or scars. No cyanosis or clubbing  Neurological/Psychiatric: Agitated, combative.   Given Haldol 5 mg 2 doses and Ativan 2 mg    INVESTIGATIONS     Laboratory Testing:     Recent Results (from the past 24 hour(s))   TYPE AND SCREEN    Collection Time: 10/02/20 12:15 PM   Result Value Ref Range    Expiration Date 10/05/2020,2359     Arm Band Number BE [id number]     ABO/Rh O POSITIVE     Antibody Screen NEGATIVE    CBC WITH AUTO DIFFERENTIAL    Collection Time: 10/02/20 12:27 PM   Result Value Ref Range    WBC 9.5 3.5 - 11.3 k/uL    RBC 3.33 (L) 4.21 - 5.77 m/uL    Hemoglobin 11.8 (L) 13.0 - 17.0 g/dL    Hematocrit 35.8 (L) 40.7 - 50.3 %    .5 (H) 82.6 - 102.9 fL    MCH 35.4 (H) 25.2 - 33.5 pg    MCHC 33.0 28.4 - 34.8 g/dL    RDW 14.6 (H) 11.8 - 14.4 %    Platelets 799 234 - 593 k/uL    MPV 10.1 8.1 - 13.5 fL    NRBC Automated 0.0 0.0 per 100 WBC    Differential Type NOT REPORTED     Seg Neutrophils 56 36 - 65 %    Lymphocytes 29 24 - 43 %    Monocytes 11 3 - 12 %    Eosinophils % 1 1 - 4 %    Basophils 2 0 - 2 %    Immature Granulocytes 1 (H) 0 %    Segs Absolute 5.43 1.50 - 8.10 k/uL    Absolute Lymph # 2.77 1.10 - 3.70 k/uL    Absolute Mono # 1.03 0.10 - 1.20 k/uL    Absolute Eos # 0.05 0.00 - 0.44 k/uL    Basophils Absolute 0.16 0.00 - 0.20 k/uL    Absolute Immature Granulocyte 0.07 0.00 - 0.30 k/uL    WBC Morphology NOT REPORTED     RBC Morphology ANISOCYTOSIS PRESENT     Platelet Estimate NOT REPORTED    COMPREHENSIVE METABOLIC PANEL    Collection Time: 10/02/20 12:27 PM   Result Value Ref Range    Glucose 134 (H) 70 - 99 mg/dL    BUN 3 (L) 6 - 20 mg/dL    CREATININE 0.42 (L) 0.70 - 1.20 mg/dL    Bun/Cre Ratio NOT REPORTED 9 - 20    Calcium 8.9 8.6 - 10.4 mg/dL    Sodium 138 135 - 144 mmol/L    Potassium 3.7 3.7 - 5.3 mmol/L    Chloride 101 98 - 107 mmol/L    CO2 22 20 - 31 mmol/L    Anion Gap 15 9 - 17 mmol/L    Alkaline Phosphatase 257 (H) 40 - 129 U/L    ALT 96 (H) 5 - 41 U/L     (H) <40 U/L    Total Bilirubin 2.53 (H) 0.3 - 1.2 mg/dL    Total Protein 7.8 6.4 - 8.3 g/dL    Alb 3.5 3.5 - 5.2 g/dL    Albumin/Globulin Ratio 0.8 (L) 1.0 - 2.5    GFR Non-African American >60 >60 mL/min    GFR African American >60 >60 mL/min    GFR Comment          GFR Staging NOT REPORTED    Protime-INR    Collection Time: 10/02/20 12:27 PM   Result Value Ref Range    Protime 11.5 9.0 - 12.0 sec    INR 1.1    APTT    Collection Time: 10/02/20 12:27 PM   Result Value Ref Range    PTT 27.7 20.5 - 30.5 sec   TOX SCR, BLD, ED    Collection Time: 10/02/20 12:27 PM   Result Value Ref Range    Acetaminophen Level <5 (L) 10 - 30 ug/mL    Ethanol 342 (HH) <10 mg/dL    Ethanol percent 0.342 (H) <8.857 %    Salicylate Lvl <1 (L) 3 - 10 mg/dL    Toxic Tricyclic Sc,Blood NEGATIVE NEGATIVE   LIPASE    Collection Time: 10/02/20 12:27 PM   Result Value Ref Range    Lipase 64 (H) 13 - 60 U/L   Ammonia    Collection Time: 10/02/20 12:27 PM   Result Value Ref Range    Ammonia 66 (H) 16 - 60 umol/L   Bilirubin, Direct    Collection Time: 10/02/20 12:27 PM   Result Value Ref Range    Bilirubin, Direct 1.48 (H) <0.31 mg/dL   Urinalysis Reflex to Culture    Collection Time: 10/02/20  6:55 PM    Specimen: Urine, clean catch   Result Value Ref Range    Color, UA YELLOW YELLOW    Turbidity UA CLEAR CLEAR    Glucose, Ur NEGATIVE NEGATIVE    Bilirubin Urine NEGATIVE NEGATIVE    Ketones, Urine NEGATIVE NEGATIVE    Specific Gravity, UA 1.011 1.005 - 1.030    Urine Hgb NEGATIVE NEGATIVE    pH, UA 5.5 5.0 - 8.0    Protein, UA NEGATIVE NEGATIVE    Urobilinogen, Urine Normal Normal    Nitrite, Urine NEGATIVE NEGATIVE    Leukocyte Esterase, Urine NEGATIVE NEGATIVE    Urinalysis Comments       Microscopic exam not performed based on chemical results unless requested in original order. Drug screen multi urine    Collection Time: 10/02/20  6:55 PM   Result Value Ref Range    Amphetamine Screen, Ur NEGATIVE NEGATIVE    Barbiturate Screen, Ur NEGATIVE NEGATIVE    Benzodiazepine Screen, Urine NEGATIVE NEGATIVE    Cocaine Metabolite, Urine NEGATIVE NEGATIVE    Methadone Screen, Urine NEGATIVE NEGATIVE    Opiates, Urine NEGATIVE NEGATIVE    Phencyclidine, Urine NEGATIVE NEGATIVE    Propoxyphene, Urine NOT REPORTED NEGATIVE    Cannabinoid Scrn, Ur POSITIVE (A) NEGATIVE    Oxycodone Screen, Ur NEGATIVE NEGATIVE    Methamphetamine, Urine NOT REPORTED NEGATIVE    Tricyclic Antidepressants, Urine NOT REPORTED NEGATIVE    MDMA, Urine NOT REPORTED NEGATIVE    Buprenorphine Urine NOT REPORTED NEGATIVE    Test Information       Assay provides medical screening only. The absence of expected drug(s) and/or metabolite(s) may indicate diluted or adulterated urine, limitations of testing or timing of collection. Imaging:   Ct Abdomen Pelvis W Iv Contrast Additional Contrast? None    Result Date: 10/2/2020  1. Haziness around the pancreas consistent with pancreatitis. 2.  Fluid around the gallbladder. Findings may be secondary to pancreatitis with acalculous cholecystitis not excluded. 3.  Hepatomegaly with fatty infiltration of the liver. Xr Chest Portable    Result Date: 10/2/2020  No acute cardiopulmonary process. ASSESSMENT & PLAN     ASSESSMENT / PLAN:     IMPRESSION    Active Problems:  Alcoholic hepatitis without ascites  -Monitor LFTs daily.  -Ammonia 66, ALT 96, , bilirubin 2.53, direct bilirubin 1.48,   -Madrey discriminant factor score for alcoholic hepatitis -28.8 points.   -CT abdomen showed hepatomegaly, with fatty infiltration of liver but no ascites fluid present.  -Discontinue Tylenol due to increased LFTs. Alcohol induced acute pancreatitis without infection or necrosis  -Lipase 64  -Repeat lipase x 1 time  -CT abdomen pelvis showed stranding around the tail and haziness around the head of the pancreas, likely pancreatitis.  -No gallstones seen but pericholecystic fluid noted around the gallbladder.  -Diet n.p.o.  -Ringer's lactate bolus 1 L at 500 mL/h x 1 time.  -Zofran 4 mg IV for nausea. -Lactic acid blood 2.2. Will repeat lactic acid tomorrow.  -1 L 0.9 NS bolus given. -IV 0.9 normal saline infusion at 100 mL/h to be continued following bolus. Alcohol withdrawal secondary to intoxication:  -Patient placed on CIWA protocol.  -Folate, B12 wnl.  -Multivitamin, thiamine 447 mg, folic acid 1 mg replaced.  -will assess mentation tomorrow. DVT ppx: None  GI ppx: Protonix IV 40 mg daily. PT/OT/SW: Ordered  Discharge Planning: To be covered by case management. Lele Ford MD  Internal Medicine Resident, PGY-1  [address], [place]  10/2/2020, 9:58 PM    Attending Physician Statement  I have discussed the care of Gerry Ferrer and I have examined the patient myselft and taken ros and hpi , including pertinent history and exam findings,  with the resident. I have reviewed the key elements of all parts of the encounter with the resident. I agree with the assessment, plan and orders as documented by the resident.       Electronically signed by Shama Tafoya MD

## 2020-10-03 NOTE — CARE COORDINATION
Case Management Initial Discharge Plan  Pio Brody             Met with:patient to discuss discharge plans. Information verified: address, contacts, phone number, , insurance Yes, change of address to [address] [place] [address]., [facility], [address] States he does not know about the [place] [address] address on file. Emergency Contact/Next of Kin name & number: Trinity Herrera, mother, [phone]    PCP: No primary care provider on file. Date of last visit: states No doctor    Insurance Provider: Medicaid    Discharge Planning    Living Arrangements:  Parent   Support Systems:  Parent    Home has 1 stories  0 stairs to climb to get into front door, n/a stairs to climb to reach second floor  Location of bedroom/bathroom in home main floor    Patient able to perform ADL's:Independent    Current Services (outpatient & in home) none  DME equipment: n/a  DME provider: n/a    Receiving oral anticoagulation therapy? No    If indicated:   Physician managing anticoagulation treatment: n/a  Where does patient obtain lab work for ATC treatment? n/a      Potential Assistance Needed:  N/A    Patient agreeable to home care: No  Bourg of choice provided:  n/a    Prior SNF/Rehab Placement and Facility: No, but was in inpatient or outpatient rehab before for addiction, but does not know the names of the facilities, and stated they did Not help. Agreeable to SNF/Rehab: No  Bourg of choice provided: n/a     Evaluation: yes    Expected Discharge date:       Patient expects to be discharged to: Follow Up Appointment: Best Day/ Time:      Transportation provider: Mother, Trinity Herrera [phone]  Transportation arrangements needed for discharge: No    Readmission Risk              Risk of Unplanned Readmission:        11             Does patient have a readmission risk score greater than 14?: No  If yes, follow-up appointment must be made within 7 days of discharge.      Goals of Care:       Discharge Plan: Home independently, with mother.            Electronically signed by Cris Rubio RN on 10/3/20 at 10:21 AM EDT

## 2020-10-04 VITALS
BODY MASS INDEX: 20.44 KG/M2 | RESPIRATION RATE: 15 BRPM | TEMPERATURE: 98.9 F | HEIGHT: 71 IN | HEART RATE: 66 BPM | OXYGEN SATURATION: 100 % | DIASTOLIC BLOOD PRESSURE: 93 MMHG | SYSTOLIC BLOOD PRESSURE: 128 MMHG | WEIGHT: 146 LBS

## 2020-10-04 LAB
ABSOLUTE EOS #: 0.07 K/UL (ref 0–0.44)
ABSOLUTE IMMATURE GRANULOCYTE: 0.03 K/UL (ref 0–0.3)
ABSOLUTE LYMPH #: 1.66 K/UL (ref 1.1–3.7)
ABSOLUTE MONO #: 0.6 K/UL (ref 0.1–1.2)
ALBUMIN SERPL-MCNC: 2.6 G/DL (ref 3.5–5.2)
ALBUMIN/GLOBULIN RATIO: 0.8 (ref 1–2.5)
ALP BLD-CCNC: 180 U/L (ref 40–129)
ALT SERPL-CCNC: 59 U/L (ref 5–41)
ANION GAP SERPL CALCULATED.3IONS-SCNC: 8 MMOL/L (ref 9–17)
AST SERPL-CCNC: 116 U/L
BASOPHILS # BLD: 1 % (ref 0–2)
BASOPHILS ABSOLUTE: 0.08 K/UL (ref 0–0.2)
BILIRUB SERPL-MCNC: 1.72 MG/DL (ref 0.3–1.2)
BUN BLDV-MCNC: 5 MG/DL (ref 6–20)
BUN/CREAT BLD: ABNORMAL (ref 9–20)
CALCIUM SERPL-MCNC: 8.3 MG/DL (ref 8.6–10.4)
CHLORIDE BLD-SCNC: 104 MMOL/L (ref 98–107)
CO2: 25 MMOL/L (ref 20–31)
CREAT SERPL-MCNC: 0.46 MG/DL (ref 0.7–1.2)
DIFFERENTIAL TYPE: ABNORMAL
EOSINOPHILS RELATIVE PERCENT: 1 % (ref 1–4)
GFR AFRICAN AMERICAN: >60 ML/MIN
GFR NON-AFRICAN AMERICAN: >60 ML/MIN
GFR SERPL CREATININE-BSD FRML MDRD: ABNORMAL ML/MIN/{1.73_M2}
GFR SERPL CREATININE-BSD FRML MDRD: ABNORMAL ML/MIN/{1.73_M2}
GLUCOSE BLD-MCNC: 79 MG/DL (ref 70–99)
HCT VFR BLD CALC: 31.7 % (ref 40.7–50.3)
HEMOGLOBIN: 10 G/DL (ref 13–17)
IMMATURE GRANULOCYTES: 0 %
LACTIC ACID, WHOLE BLOOD: 1.2 MMOL/L (ref 0.7–2.1)
LYMPHOCYTES # BLD: 24 % (ref 24–43)
MAGNESIUM: 2.2 MG/DL (ref 1.6–2.6)
MCH RBC QN AUTO: 34.6 PG (ref 25.2–33.5)
MCHC RBC AUTO-ENTMCNC: 31.5 G/DL (ref 28.4–34.8)
MCV RBC AUTO: 109.7 FL (ref 82.6–102.9)
MONOCYTES # BLD: 9 % (ref 3–12)
NRBC AUTOMATED: 0 PER 100 WBC
PDW BLD-RTO: 14 % (ref 11.8–14.4)
PLATELET # BLD: 154 K/UL (ref 138–453)
PLATELET ESTIMATE: ABNORMAL
PMV BLD AUTO: 10.3 FL (ref 8.1–13.5)
POTASSIUM SERPL-SCNC: 3.5 MMOL/L (ref 3.7–5.3)
RBC # BLD: 2.89 M/UL (ref 4.21–5.77)
RBC # BLD: ABNORMAL 10*6/UL
SEG NEUTROPHILS: 65 % (ref 36–65)
SEGMENTED NEUTROPHILS ABSOLUTE COUNT: 4.56 K/UL (ref 1.5–8.1)
SODIUM BLD-SCNC: 137 MMOL/L (ref 135–144)
TOTAL PROTEIN: 5.8 G/DL (ref 6.4–8.3)
WBC # BLD: 7 K/UL (ref 3.5–11.3)
WBC # BLD: ABNORMAL 10*3/UL

## 2020-10-04 PROCEDURE — 6370000000 HC RX 637 (ALT 250 FOR IP): Performed by: STUDENT IN AN ORGANIZED HEALTH CARE EDUCATION/TRAINING PROGRAM

## 2020-10-04 PROCEDURE — 83735 ASSAY OF MAGNESIUM: CPT

## 2020-10-04 PROCEDURE — 85025 COMPLETE CBC W/AUTO DIFF WBC: CPT

## 2020-10-04 PROCEDURE — 2580000003 HC RX 258: Performed by: STUDENT IN AN ORGANIZED HEALTH CARE EDUCATION/TRAINING PROGRAM

## 2020-10-04 PROCEDURE — 36415 COLL VENOUS BLD VENIPUNCTURE: CPT

## 2020-10-04 PROCEDURE — 80053 COMPREHEN METABOLIC PANEL: CPT

## 2020-10-04 PROCEDURE — 83605 ASSAY OF LACTIC ACID: CPT

## 2020-10-04 PROCEDURE — 99239 HOSP IP/OBS DSCHRG MGMT >30: CPT | Performed by: INTERNAL MEDICINE

## 2020-10-04 PROCEDURE — 6360000002 HC RX W HCPCS: Performed by: STUDENT IN AN ORGANIZED HEALTH CARE EDUCATION/TRAINING PROGRAM

## 2020-10-04 PROCEDURE — C9113 INJ PANTOPRAZOLE SODIUM, VIA: HCPCS | Performed by: STUDENT IN AN ORGANIZED HEALTH CARE EDUCATION/TRAINING PROGRAM

## 2020-10-04 RX ORDER — POTASSIUM CHLORIDE 20 MEQ/1
40 TABLET, EXTENDED RELEASE ORAL PRN
Status: DISCONTINUED | OUTPATIENT
Start: 2020-10-04 | End: 2020-10-04 | Stop reason: HOSPADM

## 2020-10-04 RX ORDER — POTASSIUM CHLORIDE 7.45 MG/ML
10 INJECTION INTRAVENOUS PRN
Status: DISCONTINUED | OUTPATIENT
Start: 2020-10-04 | End: 2020-10-04 | Stop reason: HOSPADM

## 2020-10-04 RX ORDER — POTASSIUM CHLORIDE 20 MEQ/1
40 TABLET, EXTENDED RELEASE ORAL ONCE
Status: COMPLETED | OUTPATIENT
Start: 2020-10-04 | End: 2020-10-04

## 2020-10-04 RX ORDER — FOLIC ACID 1 MG/1
1 TABLET ORAL DAILY
Qty: 30 TABLET | Refills: 3 | Status: SHIPPED | OUTPATIENT
Start: 2020-10-05

## 2020-10-04 RX ORDER — M-VIT,TX,IRON,MINS/CALC/FOLIC 27MG-0.4MG
1 TABLET ORAL DAILY
Qty: 60 TABLET | Refills: 5 | Status: SHIPPED | OUTPATIENT
Start: 2020-10-05

## 2020-10-04 RX ORDER — LANOLIN ALCOHOL/MO/W.PET/CERES
100 CREAM (GRAM) TOPICAL DAILY
Qty: 30 TABLET | Refills: 3 | Status: SHIPPED | OUTPATIENT
Start: 2020-10-05

## 2020-10-04 RX ADMIN — Medication 100 MG: at 08:22

## 2020-10-04 RX ADMIN — MULTIPLE VITAMINS W/ MINERALS TAB 1 TABLET: TAB at 08:22

## 2020-10-04 RX ADMIN — POTASSIUM CHLORIDE 40 MEQ: 1500 TABLET, EXTENDED RELEASE ORAL at 12:38

## 2020-10-04 RX ADMIN — FOLIC ACID 1 MG: 1 TABLET ORAL at 08:22

## 2020-10-04 RX ADMIN — PANTOPRAZOLE SODIUM 40 MG: 40 INJECTION, POWDER, FOR SOLUTION INTRAVENOUS at 08:22

## 2020-10-04 RX ADMIN — SODIUM CHLORIDE 10 ML: 9 INJECTION, SOLUTION INTRAMUSCULAR; INTRAVENOUS; SUBCUTANEOUS at 08:24

## 2020-10-04 RX ADMIN — Medication 10 ML: at 08:25

## 2020-10-04 ASSESSMENT — PAIN SCALES - GENERAL: PAINLEVEL_OUTOF10: 0

## 2020-10-04 NOTE — FLOWSHEET NOTE
DATE: 10/4/2020    NAME: Cathryn Nascimento  MRN: [id number]   : 1980    Patient not seen this date for Physical Therapy due to:  [] Blood transfusion in progress  [] Hemodialysis  []  Patient Declined  [] Spine Precautions   [] Strict Bedrest  [] Surgery/ Procedure  [] Testing      [] Other        [x] PT being discontinued at this time. Patient independent. No further needs. Observed pt ambulating independently within room, spoke with pt who voiced no acute PT concerns at this time. [] PT being discontinued at this time as the patient has been transferred to palliative care. No further needs.     Maximus Guzmán, PT

## 2020-10-04 NOTE — PROGRESS NOTES
[facility]  Internal Medicine Teaching Residency Program  Inpatient Daily Progress Note  ______________________________________________________________________________    Patient: Eboni Hopson  YOB: 1980   JIX:[id number]    Acct: [de-identified]     Room: 63/6881-06  Admit date: 10/2/2020  Today's date: 10/04/20  Number of days in the hospital: 2    SUBJECTIVE   Admitting Diagnosis: Alcohol-induced acute pancreatitis without infection or necrosis  CC: Alcohol Intoxication  Pt examined at bedside. Chart & results reviewed. No acute episodes overnight  Patient is heme stable and afebrile  Patient is active and tolerating diet well    ROS:  Constitutional:  negative for chills, fevers, sweats  Respiratory:  negative for cough, dyspnea on exertion, hemoptysis, shortness of breath, wheezing  Cardiovascular:  negative for chest pain, chest pressure/discomfort, lower extremity edema, palpitations  Gastrointestinal:  negative for abdominal pain, constipation, diarrhea, nausea, vomiting  Neurological:  negative for dizziness, headache    BRIEF HISTORY     The patient is a pleasant 39 y. o. male presents to the ER department intoxicated, brought by his father. Darwin Brar to the father, the patient is a daily drinker consuming 10-15 12 beers per day since age of 10.  He recently moved to [place] and has been having rectal incontinence, urinary incontinence, blood in stools, groin swelling.     Last drink was this morning.  Upon arrival in the ER, patient was agitated and combative, requiring 2 doses of 5 mg Haldol IV and 2 mg Ativan IV.  CT abdomen pelvis with IV contrast showed stranding around the tail and haziness around the head of the pancreas, likely pancreatitis.  Pericholecystic fluid around the gallbladder (but no gallstones) likely secondary to acalculous cholecystitis. Pilo Lava is hepatomegaly with fatty infiltration of the liver and enlarged prostate.  Chest x-ray showed no acute cardiopulmonary process.       OBJECTIVE     Vital Signs:  BP (!) 128/93   Pulse 66   Temp 98.9 °F (37.2 °C) (Oral)   Resp 15   Ht 5' 11\" (1.803 m)   Wt 146 lb (66.2 kg)   SpO2 100%   BMI 20.36 kg/m²     Temp (24hrs), Av.8 °F (37.1 °C), Min:97.9 °F (36.6 °C), Max:99.3 °F (37.4 °C)    In: 120   Out: 950 [Urine:950]    Physical Exam:  Constitutional: This is a well developed, well nourished, 18.5-24.9 - Normal 37y.o. year old male who is alert, oriented, cooperative and in no apparent distress. Respiratory: Chest was symmetrical without dullness to percussion. Breath sounds bilaterally were clear to auscultation. Cardiovascular: Regular without murmur  Abdomen: Slightly rounded and soft without organomegaly  Lymphatic: No lymphadenopathy. Musculoskeletal: Normal curvature of the spine. No gross muscle weakness. Extremities:  No lower extremity edema  Skin:  Warm and dry. Good color, turgor and pigmentation. No lesions or scars.   No cyanosis or clubbing  Neurological/Psychiatric: The patient's general behavior, level of consciousness, thought content and emotional status is normal.        Medications:  Scheduled Medications:    folic acid  1 mg Oral Daily    thiamine  100 mg Oral Daily    enoxaparin  40 mg Subcutaneous Daily    sodium chloride flush  10 mL Intravenous 2 times per day    pantoprazole  40 mg Intravenous Daily    And    sodium chloride (PF)  10 mL Intravenous Daily    therapeutic multivitamin-minerals  1 tablet Oral Daily     Continuous Infusions:   PRN Medicationssodium chloride flush, 10 mL, PRN  polyethylene glycol, 17 g, Daily PRN  promethazine, 12.5 mg, Q6H PRN    Or  ondansetron, 4 mg, Q6H PRN  LORazepam, 1 mg, Q1H PRN    Or  LORazepam, 1 mg, Q1H PRN    Or  LORazepam, 2 mg, Q1H PRN    Or  LORazepam, 2 mg, Q1H PRN    Or  LORazepam, 3 mg, Q1H PRN    Or  LORazepam, 3 mg, Q1H PRN    Or  LORazepam, 4 mg, Q1H PRN    Or  LORazepam, 4 mg, Q1H PRN      Diagnostic Labs:  CBC:   Recent Labs     10/02/20  1227 10/03/20  0523 10/04/20  0605   WBC 9.5 6.3 7.0   RBC 3.33* 2.78* 2.89*   HGB 11.8* 9.6* 10.0*   HCT 35.8* 30.2* 31.7*   .5* 108.6* 109.7*   RDW 14.6* 14.6* 14.0    160 154     BMP:   Recent Labs     10/02/20  1227 10/02/20  2301 10/03/20  0523 10/04/20  0605     --  141 137   K 3.7  --  3.7 3.5*     --  108* 104   CO2 22  --  27 25   PHOS  --  2.9  --   --    BUN 3*  --  4* 5*   CREATININE 0.42*  --  0.42* 0.46*     PT/INR:   Recent Labs     10/02/20  1227   PROTIME 11.5   INR 1.1     APTT:   Recent Labs     10/02/20  1227   APTT 27.7     LIVER PROFILE:   Recent Labs     10/02/20  1227 10/03/20  0523 10/04/20  0605   * 166* 116*   ALT 96* 66* 59*   BILIDIR 1.48*  --   --    BILITOT 2.53* 1.93* 1.72*   ALKPHOS 257* 168* 180*      MICROBIOLOGY: No results found for: CULTURE    Imaging:    Ct Abdomen Pelvis W Iv Contrast Additional Contrast? None    Result Date: 10/3/2020  1. Haziness around the pancreas consistent with pancreatitis. 2.  Fluid around the gallbladder. Findings may be secondary to pancreatitis with acalculous cholecystitis not excluded. 3.  Hepatomegaly with fatty infiltration of the liver. Xr Chest Portable    Result Date: 10/2/2020  No acute cardiopulmonary process. ASSESSMENT & PLAN     ASSESSMENT / PLAN:     Alcoholic hepatitis without ascites  -Monitor LFTs daily. -LFTs improving: ALT 96>>66, >>166, bilirubin 2.53>>1.93  -Madrey discriminant factor score for alcoholic hepatitis 4.0   -CT abdomen showed hepatomegaly, with fatty infiltration of liver but no ascites fluid present.  -Discontinue Tylenol due to increased LFTs.     Alcohol induced acute pancreatitis without infection or necrosis  -Lipase decreased from 64 to 36.   -CT abdomen pelvis showed stranding around the tail and haziness around the head of the pancreas.  -No gallstones seen but pericholecystic fluid noted around the gallbladder.  -Diet general started today.  -Zofran 4 mg IV for nausea. -Lactic acid blood 2.2, secondary to alcohol induced metabolic acidosis. -All fluids discontinued.     Alcohol withdrawal secondary to intoxication:  -Patient placed on CIWA protocol.  -Folate, B12 wnl.  -Multivitamin, thiamine 889 mg, folic acid 1 mg replaced.  -will monitor for delirium tremens, alcoholic hallucinosis as it has been 24 hours since last drink. DVT ppx : None  GI ppx: Protonix    PT/OT/SW: On Board   Discharge Planning: Home Today    Jorge Wilson MD  Internal Medicine Resident, PGY-1  [address], [place]  10/4/2020, 8:27 AM    Attending Physician Statement  I have discussed the care of Quincy Martinez and I have examined the patient myselft and taken ros and hpi , including pertinent history and exam findings,  with the resident. I have reviewed the key elements of all parts of the encounter with the resident. I agree with the assessment, plan and orders as documented by the resident.       Electronically signed by Cheng Joya MD

## 2020-10-04 NOTE — PLAN OF CARE
Problem: Fluid Volume - Deficit:  Goal: Absence of fluid volume deficit signs and symptoms  Description: Absence of fluid volume deficit signs and symptoms  10/4/2020 0503 by Tono Vila RN  Outcome: Met This Shift     Problem: Discharge Planning:  Goal: Discharged to appropriate level of care  Description: Discharged to appropriate level of care  10/4/2020 0503 by Tono Vila RN  Outcome: Met This Shift     Problem: Nutrition Deficit:  Goal: Ability to achieve adequate nutritional intake will improve  Description: Ability to achieve adequate nutritional intake will improve  10/4/2020 0503 by Tono Vila RN  Outcome: Met This Shift     Problem: Sleep Pattern Disturbance:  Goal: Appears well-rested  Description: Appears well-rested  10/4/2020 0503 by Tono Vila RN  Outcome: Met This Shift
Problem: Fluid Volume - Deficit:  Goal: Absence of fluid volume deficit signs and symptoms  Outcome: Met This Shift     Problem: Sleep Pattern Disturbance:  Goal: Appears well-rested  Outcome: Met This Shift     Problem: Violence - Risk of, Self/Other-Directed:  Goal: Knowledge of developmental care interventions  Outcome: Met This Shift    Pt was given maintenance fluids and provided an environment with decreased stimulation. Seizure precautions were established. Pt in lowest position and near the nurses station for close monitoring. Pt slept well overnight was calm and appropriate throughout the night. No seizure like activity this shift. Remains free from injury. Safety precautions in place. Fall precatiouns in place.
increase  Description: Signs of adequate cerebral perfusion will increase  Outcome: Ongoing  Goal: Ability to maintain a stable neurologic state will improve  Description: Ability to maintain a stable neurologic state will improve  Outcome: Ongoing     Problem: Safety:  Goal: Ability to remain free from injury will improve  Description: Ability to remain free from injury will improve  Outcome: Ongoing     Problem: Self-Concept:  Goal: Level of anxiety will decrease  Description: Level of anxiety will decrease  Outcome: Met This Shift  Goal: Ability to verbalize feelings about condition will improve  Description: Ability to verbalize feelings about condition will improve  Outcome: Ongoing

## 2020-10-04 NOTE — CARE COORDINATION
Discharge [address] [facility]  Clinical Case Management Department  Written by: Lacey Valadez RN    Patient Name: Gerry Ferrer  Attending Provider: No att. providers found  Admit Date: 10/2/2020 11:35 AM  MRN: [id number]  Account: [de-identified]                     : 1980  Discharge Date: 10/4/2020      Disposition: Home independently.      Lacey Valadez RN

## 2020-10-12 NOTE — DISCHARGE SUMMARY
[facility]     Department of Internal Medicine - Staff Internal Medicine Teaching Service    INPATIENT DISCHARGE SUMMARY      Patient Identification:  Asif Ortega is a 37 y.o. male. :  1980  MRN: [id number]     Acct: [de-identified]   PCP: No primary care provider on file. Admit Date:  10/2/2020  Discharge date and time: 10/4/2020 12:43 PM   Attending Provider: No att. providers found                                     [address] Problem Lists:  Principal Problem:    Alcohol-induced acute pancreatitis without infection or necrosis  Active Problems:    Transaminitis    Alcoholic hepatitis without ascites    Alcohol intoxication with delirium ([facility] [address]  Resolved Problems:    * No resolved hospital problems. *      HOSPITAL STAY     Brief Inpatient course:   Asif Ortega is a 37 y.o. male who was admitted for the management of Alcohol-induced acute pancreatitis without infection or necrosis, presented to the emergency department intoxicated, brought by his father. Elvia Boot to the father, the patient is a daily drinker consuming 10-15 12 beers per day since age of 10. Myrtle Iniguez recently moved to [place] and has been having rectal incontinence, urinary incontinence, blood in stools, groin swelling.     Last drink was the morning of ER admission. Cisne Rota arrival in the ER, patient was agitated and combative, requiring 2 doses of 5 mg Haldol IV and 2 mg Ativan IV.  CT abdomen pelvis with IV contrast showed stranding around the tail and haziness around the head of the pancreas, likely pancreatitis.  Pericholecystic fluid around the gallbladder (but no gallstones) likely secondary to acalculous cholecystitis. Candy Seller was hepatomegaly with fatty infiltration of the liver and enlarged prostate.  Chest x-ray showed no acute cardiopulmonary process.  Madrey discriminant factor score for alcoholic hepatitis 4.0     Patient was placed on CIWA protocol, was